# Patient Record
Sex: FEMALE | Race: WHITE | NOT HISPANIC OR LATINO | Employment: FULL TIME | URBAN - NONMETROPOLITAN AREA
[De-identification: names, ages, dates, MRNs, and addresses within clinical notes are randomized per-mention and may not be internally consistent; named-entity substitution may affect disease eponyms.]

---

## 2019-03-15 ENCOUNTER — TRANSCRIBE ORDERS (OUTPATIENT)
Dept: ADMINISTRATIVE | Facility: HOSPITAL | Age: 49
End: 2019-03-15

## 2019-03-15 DIAGNOSIS — Z12.39 SCREENING BREAST EXAMINATION: Primary | ICD-10-CM

## 2019-03-24 NOTE — PROGRESS NOTES
[facility] at [facility]  Consultation H&P  Nadine Llanes  1970     VISIT DATE:  19    PCP: Olamide Orourke APRN  [id number] [place], [place] [id number]    IDENTIFICATION: A 48 y.o. female from [place], [place]. 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    CC:  Chief Complaint   Patient presents with   • Shortness of Breath     CONSULT   • Abnormal ECG       PROBLEM LIST:  1. HTN  2. HLD  1. 2/15/19   HDL 43    3. Family history of premature CAD  1. Father passed from MI in early 50s  4. Elevated transaminases, likely fatty liver  5. , nl pregnancies   6. Surgical history:  1. Hysterectomy     Allergies  No Known Allergies    Current Medications    Current Outpatient Medications:   •  atorvastatin (LIPITOR) 20 MG tablet, Take 1 tablet by mouth Daily., Disp: 90 tablet, Rfl: 3     History of Present Illness   HPI  Nadine Llanes is a 48 y.o. year old female with the above mentioned PMH who presents for consult from PCP Olamide WEBER for evaluation of an abnormal EKG.    Pt reports she does have some family hx of premature CAD, her father passed of MI at age 53. He was a smoker. No other famaily hx of CAD.     Pt denies any chest pain, dyspnea at rest,  orthopnea, PND, lower extremity edema, or claudication. Pt denies history of CHF, DVT, PE, MI, CVA, TIA, or rheumatic fever.     Pt does not get any formal exercise, the most activity she gets on a regular basis is going up and down stairs, cleaning, grocery shopping. She will get slightly dyspneic if she walks too quickly up stairs.       ROS  Review of Systems   Constitution: Positive for weight gain.   HENT: Positive for tinnitus.    Respiratory: Positive for shortness of breath and snoring.    Musculoskeletal: Positive for myalgias.   Gastrointestinal: Positive for heartburn.   Neurological: Positive for excessive daytime sleepiness and headaches.   All other systems reviewed and are negative.      SOCIAL HX  Social  "History     Socioeconomic History   • Marital status:      Spouse name: Not on file   • Number of children: Not on file   • Years of education: Not on file   • Highest education level: Not on file   Tobacco Use   • Smoking status: Never Smoker   • Smokeless tobacco: Never Used   Substance and Sexual Activity   • Alcohol use: No     Frequency: Never   • Drug use: No   • Sexual activity: Defer       FAMILY HX  Family History   Problem Relation Age of Onset   • Heart attack Father        Vitals:    03/25/19 0945   BP: 130/88   BP Location: Right arm   Patient Position: Sitting   Pulse: 78   SpO2: 96%   Weight: 112 kg (246 lb)   Height: 170.2 cm (67\")       PHYSICAL EXAMINATION:  Physical Exam   Constitutional: She is oriented to person, place, and time. She appears well-developed and well-nourished. No distress.   obese   HENT:   Head: Normocephalic and atraumatic.   Right Ear: External ear normal.   Left Ear: External ear normal.   Nose: Nose normal.   Eyes: Conjunctivae and EOM are normal.   Neck: Neck supple. No hepatojugular reflux and no JVD present. Carotid bruit is not present. No thyromegaly present.   Cardiovascular: Normal rate, regular rhythm, S1 normal, S2 normal, normal heart sounds, intact distal pulses and normal pulses. Exam reveals no gallop, no distant heart sounds and no midsystolic click.   No murmur heard.  Pulses:       Radial pulses are 2+ on the right side, and 2+ on the left side.        Dorsalis pedis pulses are 2+ on the right side, and 2+ on the left side.        Posterior tibial pulses are 2+ on the right side, and 2+ on the left side.   Pulmonary/Chest: Effort normal and breath sounds normal. No respiratory distress. She has no decreased breath sounds. She has no wheezes. She has no rhonchi. She has no rales.   Abdominal: Soft. Bowel sounds are normal. There is no hepatosplenomegaly. There is no tenderness.   Musculoskeletal: Normal range of motion. She exhibits no edema. "   Neurological: She is alert and oriented to person, place, and time.   No focal deficits.   Skin: Skin is warm and dry. No erythema.   Psychiatric: She has a normal mood and affect. Thought content normal.   Nursing note and vitals reviewed.      Diagnostic Data:  Procedures   2/15/19   HDL 43     WBC 7.3 Hgb 13 HCT 41.7     Glucose 84 BUN 12 creatinine 0.63 sodium 143 potassium 4.3 chloride 104 CO2 21 calcium 9.6    Total protein 7.1 albumin 4.8 total bili 0.4 alk phos 70 AST 41 ALT 45    A1c 5.3    TSH 2.89    Vit D 15.6      No results found for: CHLPL, TRIG, HDL, LDLDIRECT  No results found for: GLUCOSE, BUN, CREATININE, NA, K, CL, CO2, CREATININE, BUN  No results found for: HGBA1C  No results found for: WBC, HGB, HCT, PLT    ASSESSMENT:   Diagnosis Plan   1. Dyspnea on exertion  Adult Transthoracic Echo Complete W/ Cont if Necessary Per Protocol   2. Mixed hyperlipidemia  atorvastatin (LIPITOR) 20 MG tablet   3. Snoring  Overnight Sleep Oximetry Study   4. Palpitations  Adult Transthoracic Echo Complete W/ Cont if Necessary Per Protocol         PLAN:  1. With some shortness of breath, and decreased voltage on ekg, we will evaluate structure and function of heart w echocardiogram to ensure no abnormalities. Also will be able to evaluate pulmonary pressures.   2. Despite low 10 year risk, with poor lipid profile and some element of likely fatty liver, with family hx, pt would benefit from statin therapy. She is agreeable. Noted that her vitamin D was low, with any myalgias would recommend to supplement this. Pt to have lipids rechecked in 6 weeks. Pt also counseled on elevated TGs corresponding w inulin resistance. Rec to limit carb intake and get regular exercise.   3. With snoring, we will screen for FLASH w overnight pulseox monitoring.     Scribed for Juliano Riddle MD by Roopa Martinez PA-C. 3/25/2019  10:30 AM    Juliano Riddle MD, [facility]  I, Juliano Riddle MD, personally  performed the services described in this documentation as scribed by the above named individual in my presence, and it is both accurate and complete.  3/25/2019  10:49 AM

## 2019-03-25 ENCOUNTER — CONSULT (OUTPATIENT)
Dept: CARDIOLOGY | Facility: CLINIC | Age: 49
End: 2019-03-25

## 2019-03-25 VITALS
BODY MASS INDEX: 38.61 KG/M2 | OXYGEN SATURATION: 96 % | HEIGHT: 67 IN | WEIGHT: 246 LBS | SYSTOLIC BLOOD PRESSURE: 130 MMHG | DIASTOLIC BLOOD PRESSURE: 88 MMHG | HEART RATE: 78 BPM

## 2019-03-25 DIAGNOSIS — R00.2 PALPITATIONS: ICD-10-CM

## 2019-03-25 DIAGNOSIS — R06.09 DYSPNEA ON EXERTION: Primary | ICD-10-CM

## 2019-03-25 DIAGNOSIS — E78.2 MIXED HYPERLIPIDEMIA: ICD-10-CM

## 2019-03-25 DIAGNOSIS — R06.83 SNORING: ICD-10-CM

## 2019-03-25 PROCEDURE — 99244 OFF/OP CNSLTJ NEW/EST MOD 40: CPT | Performed by: INTERNAL MEDICINE

## 2019-03-25 RX ORDER — ATORVASTATIN CALCIUM 20 MG/1
20 TABLET, FILM COATED ORAL DAILY
Qty: 90 TABLET | Refills: 3 | Status: SHIPPED | OUTPATIENT
Start: 2019-03-25

## 2019-03-29 ENCOUNTER — TELEPHONE (OUTPATIENT)
Dept: CARDIOLOGY | Facility: CLINIC | Age: 49
End: 2019-03-29

## 2019-03-29 NOTE — TELEPHONE ENCOUNTER
----- Message from Juliano Riddle MD sent at 3/28/2019  3:36 PM EDT -----  Overnight Oximetry not highly suggestive of luz marina

## 2019-04-12 ENCOUNTER — APPOINTMENT (OUTPATIENT)
Dept: CARDIOLOGY | Facility: HOSPITAL | Age: 49
End: 2019-04-12

## 2019-04-26 ENCOUNTER — HOSPITAL ENCOUNTER (OUTPATIENT)
Dept: MAMMOGRAPHY | Facility: HOSPITAL | Age: 49
Discharge: HOME | End: 2019-04-26
Admitting: NURSE PRACTITIONER

## 2019-04-26 DIAGNOSIS — Z12.39 SCREENING BREAST EXAMINATION: ICD-10-CM

## 2019-04-26 PROCEDURE — 77063 BREAST TOMOSYNTHESIS BI: CPT

## 2019-04-26 PROCEDURE — 77067 SCR MAMMO BI INCL CAD: CPT

## 2020-11-05 ENCOUNTER — OFFICE VISIT (OUTPATIENT)
Dept: OBSTETRICS AND GYNECOLOGY | Facility: CLINIC | Age: 50
End: 2020-11-05

## 2020-11-05 VITALS
DIASTOLIC BLOOD PRESSURE: 88 MMHG | BODY MASS INDEX: 39.21 KG/M2 | WEIGHT: 244 LBS | SYSTOLIC BLOOD PRESSURE: 152 MMHG | HEIGHT: 66 IN

## 2020-11-05 DIAGNOSIS — N90.7 SEBACEOUS CYST OF LABIA: ICD-10-CM

## 2020-11-05 DIAGNOSIS — N76.4 LABIAL ABSCESS: Primary | ICD-10-CM

## 2020-11-05 PROCEDURE — 99204 OFFICE O/P NEW MOD 45 MIN: CPT | Performed by: OBSTETRICS & GYNECOLOGY

## 2020-11-05 RX ORDER — SULFAMETHOXAZOLE AND TRIMETHOPRIM 800; 160 MG/1; MG/1
1 TABLET ORAL 2 TIMES DAILY
Qty: 14 TABLET | Refills: 0 | Status: SHIPPED | OUTPATIENT
Start: 2020-11-05 | End: 2020-11-12

## 2020-11-09 LAB
HSV1 DNA SPEC QL NAA+PROBE: NEGATIVE
HSV2 DNA SPEC QL NAA+PROBE: NEGATIVE

## 2021-03-23 ENCOUNTER — BULK ORDERING (OUTPATIENT)
Dept: CASE MANAGEMENT | Facility: OTHER | Age: 51
End: 2021-03-23

## 2021-03-23 DIAGNOSIS — Z23 IMMUNIZATION DUE: ICD-10-CM
